# Patient Record
(demographics unavailable — no encounter records)

---

## 2018-09-04 NOTE — WOMENS IMAGING REPORT
EXAM DESCRIPTION:  BILAT SCREENING MAMMO W/CAD



COMPLETED DATE/TIME:  9/4/2018 1:57 pm



REASON FOR STUDY:  BILATERAL SCREENING MAMMO/[id number]  ENCNTR SCREEN MAMMOGRAM FOR MALIGNANT NEOP
LASM OF SELENA



COMPARISON:  None.



TECHNIQUE:  Standard craniocaudal and mediolateral oblique views of each breast recorded using [employer]a
l acquisition.



LIMITATIONS:  None.



FINDINGS:  RIGHT BREAST

MASSES: In the right upper inner quadrant about the 2 to 3 o'clock position 7 cm from the nipple, a s
piculated mass is present with architectural distortion almost 2 cm in size.  This worrisome for [place]
gnancy.  Diagnostic mammograms and ultrasound are recommended for followup.

CALCIFICATIONS: No new or suspicious calcifications.

ARCHITECTURAL DISTORTION: None.

DEVELOPING DENSITY: None.

ASYMMETRY: None noted.

OTHER: No other significant findings.

LEFT BREAST

MASSES: No suspicious masses.

CALCIFICATIONS: No new or suspicious calcifications.

ARCHITECTURAL DISTORTION: None.

DEVELOPING DENSITY: None.

ASYMMETRY: None noted.

OTHER: No other significant findings.

Read with the assistance of CAD.

.[facility] - R2 [employer] Version 1.3

.[facility] - R2 Cenova Version 1.3

.[facility] - R2 Cenova Version 2.4

.[facility] [place] [facility] - R2 Cenova Version 2.4

.[facility] - R2  Version 9.2



IMPRESSION:  Mammographic mass with architectural distortion upper inner quadrant right breast for wh
ich diagnostic mammography and ultrasound are recommended.

No mammographic evidence for malignancy left breast



BREAST DENSITY:  b. There are scattered areas of fibroglandular density.



BIRAD:  0 Incomplete:  Needs Additional Imaging Evaluation and/or prior Mammograms for Comparison.



RECOMMENDATION:  RECOMMENDED FOLLOW-UP: Additional right breast diagnostic mammograms and ultrasound

The patient will be contacted for additional imaging.



COMMENT:  The patient has been notified of the results by letter per [facility] requirements. Additional no
tification policies are in place for contacting patient with suspicious or incomplete findings.

Quality ID #225: The American College of Radiology recommends an annual screening mammogram for women
 aged 40 years or over. This facility utilizes a reminder system to ensure that all patients receive 
reminder letters, and/or direct phone calls for appointments. This includes reminders for routine scr
eening mammograms, diagnostic mammograms, or other Breast Imaging Interventions when appropriate.  Th
is patient will be placed in the appropriate reminder system.

The American College of Radiology (ACR) has developed recommendations for screening MRI of the breast
s in certain patient populations, to be used in conjunction with mammography.  Breast MRI surveillanc
e may be appropriate for women with more than 20% lifetime risk of developing breast cancer  as deter
mined by genetic testing, significant family history of the disease, or history of mantle radiation f
or Hodgkins Disease.  ACR Practice Guidelines 2008.



TECHNICAL DOCUMENTATION:  FINDING NUMBER: (1)

ASSESSMENT: (1)

JOB ID:  [id number]

 2011 [employer]- All Rights Reserved



Reading location - IP/workstation name: [facility]-RR2

## 2018-09-28 NOTE — WOMENS IMAGING REPORT
EXAM DESCRIPTION:  RIGHT DIAGNOSTIC MAMMO W/CAD; U/S BREAST UNILAT LIMITED



COMPLETED DATE/TIME:  9/28/2018 10:12 am; 9/28/2018 10:35 am



REASON FOR STUDY:  RT BREAST MASS N63.22; N63.22 RIGHT BREAST MASS N63.22  UNSPECIFIED LUMP IN THE LE
FT BREAST, UPPER INNER QUAD



COMPARISON:  9/4/2018.



TECHNIQUE:  True lateral and spot compression MLO and CC images acquired.



LIMITATIONS:  None.



FINDINGS:  BREAST: right

MASSES: Spiculated mass in the deep medial breast, near the chest wall.  Irregular markedly spiculate
d margins.

CALCIFICATIONS: No new or suspicious calcifications.

ARCHITECTURAL DISTORTION: None.

DEVELOPING DENSITY: None.

ASYMMETRY: None noted.

OTHER: No other significant findings.

BREAST ULTRASOUND:

TECHNIQUE: Static and dynamic grayscale images acquired of the right breast in the specific areas of 
clinical/mammographic concern. Selected color Doppler images recorded.

ELASTOGRAPHY PERFORMED: No.

LIMITATIONS: None.

FINDINGS:

MASS: Solid mass in the deep breast in the 1 to 2 o'clock location, near the chest wall.  1.3 x 1.7 x
 2.2 cm.  Irregular margins.  Flow present on Doppler imaging.

ELASTOGRAPHY CHARACTERISTICS: Not applicable.

OTHER: No other significant finding.



IMPRESSION:  Suspicious solid mass in the deep medial breast near the chest wall.



BREAST DENSITY:  b. There are scattered areas of fibroglandular density.



BIRAD:  5 Highly suggestive of malignancy. Appropriate action should be taken.



RECOMMENDATION:  RECOMMENDED FOLLOW UP: Birads 5: Biopsy should be performed in the absence of clinic
al contraindication.

SPECIFIC INTERVENTION/IMAGING/CONSULTATION RECOMMENDED:The suspicious finding(s) amenable to US guide
d core/vacuum assisted biopsy.

COMMUNICATION:The imaging findings were discussed with the patient. Her referring provider has been n
otified of the findings.



COMMENT:  The patient has been notified of the results by letter per MQSA requirements. Additional no
tification policies are in place for contacting patient with suspicious or incomplete findings.

Quality ID #225: The American College of Radiology recommends an annual screening mammogram for women
 aged 40 years or over. This facility utilizes a reminder system to ensure that all patients receive 
reminder letters, and/or direct phone calls for appointments. This includes reminders for routine scr
eening mammograms, diagnostic mammograms, or other Breast Imaging Interventions when appropriate.  Th
is patient will be placed in the appropriate reminder system.

The American College of Radiology (ACR) has developed recommendations for screening MRI of the breast
s in certain patient populations, to be used in conjunction with mammography.  Breast MRI surveillanc
e may be appropriate for women with more than 20% lifetime risk of developing breast cancer  as deter
mined by genetic testing, significant family history of the disease, or history of mantle radiation f
or Hodgkins Disease.  ACR Practice Guidelines 2008.



TECHNICAL DOCUMENTATION:  FINDING NUMBER: (1)

ASSESSMENT: (1)

JOB ID:  [id number]

 2011 [employer]- All Rights Reserved



Reading location - IP/workstation name: [facility]-RR2

## 2018-09-28 NOTE — WOMENS IMAGING REPORT
EXAM DESCRIPTION:  RIGHT DIAGNOSTIC MAMMO W/CAD; U/S BREAST UNILAT LIMITED



COMPLETED DATE/TIME:  9/28/2018 10:12 am; 9/28/2018 10:35 am



REASON FOR STUDY:  RT BREAST MASS N63.22; N63.22 RIGHT BREAST MASS N63.22  UNSPECIFIED LUMP IN THE LE
FT BREAST, UPPER INNER QUAD



COMPARISON:  9/4/2018.



TECHNIQUE:  True lateral and spot compression MLO and CC images acquired.



LIMITATIONS:  None.



FINDINGS:  BREAST: right

MASSES: Spiculated mass in the deep medial breast, near the chest wall.  Irregular markedly spiculate
d margins.

CALCIFICATIONS: No new or suspicious calcifications.

ARCHITECTURAL DISTORTION: None.

DEVELOPING DENSITY: None.

ASYMMETRY: None noted.

OTHER: No other significant findings.

BREAST ULTRASOUND:

TECHNIQUE: Static and dynamic grayscale images acquired of the right breast in the specific areas of 
clinical/mammographic concern. Selected color Doppler images recorded.

ELASTOGRAPHY PERFORMED: No.

LIMITATIONS: None.

FINDINGS:

MASS: Solid mass in the deep breast in the 1 to 2 o'clock location, near the chest wall.  1.3 x 1.7 x
 2.2 cm.  Irregular margins.  Flow present on Doppler imaging.

ELASTOGRAPHY CHARACTERISTICS: Not applicable.

OTHER: No other significant finding.



IMPRESSION:  Suspicious solid mass in the deep medial breast near the chest wall.



BREAST DENSITY:  b. There are scattered areas of fibroglandular density.



BIRAD:  5 Highly suggestive of malignancy. Appropriate action should be taken.



RECOMMENDATION:  RECOMMENDED FOLLOW UP: Birads 5: Biopsy should be performed in the absence of clinic
al contraindication.

SPECIFIC INTERVENTION/IMAGING/CONSULTATION RECOMMENDED:The suspicious finding(s) amenable to US guide
d core/vacuum assisted biopsy.

COMMUNICATION:The imaging findings were discussed with the patient. Her referring provider has been n
otified of the findings.



COMMENT:  The patient has been notified of the results by letter per MQSA requirements. Additional no
tification policies are in place for contacting patient with suspicious or incomplete findings.

Quality ID #225: The American College of Radiology recommends an annual screening mammogram for women
 aged 40 years or over. This facility utilizes a reminder system to ensure that all patients receive 
reminder letters, and/or direct phone calls for appointments. This includes reminders for routine scr
eening mammograms, diagnostic mammograms, or other Breast Imaging Interventions when appropriate.  Th
is patient will be placed in the appropriate reminder system.

The American College of Radiology (ACR) has developed recommendations for screening MRI of the breast
s in certain patient populations, to be used in conjunction with mammography.  Breast MRI surveillanc
e may be appropriate for women with more than 20% lifetime risk of developing breast cancer  as deter
mined by genetic testing, significant family history of the disease, or history of mantle radiation f
or Hodgkins Disease.  ACR Practice Guidelines 2008.



TECHNICAL DOCUMENTATION:  FINDING NUMBER: (1)

ASSESSMENT: (1)

JOB ID:  [id number]

 2011 [employer]- All Rights Reserved



Reading location - IP/workstation name: [facility]-RR2